# Patient Record
Sex: FEMALE | Race: WHITE | Employment: OTHER | URBAN - METROPOLITAN AREA
[De-identification: names, ages, dates, MRNs, and addresses within clinical notes are randomized per-mention and may not be internally consistent; named-entity substitution may affect disease eponyms.]

---

## 2017-03-08 ENCOUNTER — HOSPITAL ENCOUNTER (OUTPATIENT)
Dept: INFUSION THERAPY | Age: 77
Discharge: HOME | End: 2017-03-08
Payer: MEDICARE

## 2017-03-08 VITALS
RESPIRATION RATE: 16 BRPM | HEART RATE: 71 BPM | BODY MASS INDEX: 27.26 KG/M2 | DIASTOLIC BLOOD PRESSURE: 73 MMHG | OXYGEN SATURATION: 97 % | SYSTOLIC BLOOD PRESSURE: 126 MMHG | TEMPERATURE: 97.6 F | WEIGHT: 163.8 LBS

## 2017-03-08 PROCEDURE — 74011250636 HC RX REV CODE- 250/636

## 2017-03-08 PROCEDURE — 96372 THER/PROPH/DIAG INJ SC/IM: CPT

## 2017-03-08 RX ADMIN — DENOSUMAB 60 MG: 60 INJECTION SUBCUTANEOUS at 15:09

## 2017-03-08 NOTE — PROGRESS NOTES
Arrived to the [facility]. Prolia injection completed. Patient tolerated well. Any issues or concerns during appointment: None. Patient aware that her doctor's office will need to fax us a new Prolia order for her September injection. Next appointment TBD. Discharged ambulatory from Infusion.

## 2017-09-27 ENCOUNTER — HOSPITAL ENCOUNTER (OUTPATIENT)
Dept: INFUSION THERAPY | Age: 77
Discharge: HOME | End: 2017-09-27
Payer: MEDICARE

## 2017-09-27 VITALS
DIASTOLIC BLOOD PRESSURE: 59 MMHG | WEIGHT: 167.2 LBS | RESPIRATION RATE: 16 BRPM | HEART RATE: 69 BPM | TEMPERATURE: 98.1 F | OXYGEN SATURATION: 98 % | BODY MASS INDEX: 27.82 KG/M2 | SYSTOLIC BLOOD PRESSURE: 120 MMHG

## 2017-09-27 LAB
CALCIUM SERPL-MCNC: 9 MG/DL (ref 8.3–10.4)
CREAT SERPL-MCNC: 0.9 MG/DL (ref 0.6–1)

## 2017-09-27 PROCEDURE — 96372 THER/PROPH/DIAG INJ SC/IM: CPT

## 2017-09-27 PROCEDURE — 82565 ASSAY OF CREATININE: CPT | Performed by: INTERNAL MEDICINE

## 2017-09-27 PROCEDURE — 74011250636 HC RX REV CODE- 250/636

## 2017-09-27 PROCEDURE — 82310 ASSAY OF CALCIUM: CPT | Performed by: INTERNAL MEDICINE

## 2017-09-27 PROCEDURE — 36415 COLL VENOUS BLD VENIPUNCTURE: CPT | Performed by: INTERNAL MEDICINE

## 2017-09-27 RX ADMIN — DENOSUMAB 60 MG: 60 INJECTION SUBCUTANEOUS at 14:30

## 2017-09-27 NOTE — PROGRESS NOTES
Arrived to the [facility]. Prolia given. Patient tolerated well  Any issues or concerns during appointment: No  No futire appointments in [facility]PI @ this time  Discharged home ambulatory

## 2018-04-06 ENCOUNTER — HOSPITAL ENCOUNTER (OUTPATIENT)
Dept: INFUSION THERAPY | Age: 78
Discharge: HOME | End: 2018-04-06
Payer: MEDICARE

## 2018-04-06 VITALS
HEART RATE: 70 BPM | BODY MASS INDEX: 28.09 KG/M2 | OXYGEN SATURATION: 97 % | TEMPERATURE: 97.4 F | SYSTOLIC BLOOD PRESSURE: 142 MMHG | WEIGHT: 168.8 LBS | RESPIRATION RATE: 16 BRPM | DIASTOLIC BLOOD PRESSURE: 73 MMHG

## 2018-04-06 LAB
CALCIUM SERPL-MCNC: 9.2 MG/DL (ref 8.3–10.4)
CREAT SERPL-MCNC: 1 MG/DL (ref 0.6–1)

## 2018-04-06 PROCEDURE — 36415 COLL VENOUS BLD VENIPUNCTURE: CPT

## 2018-04-06 PROCEDURE — 74011250636 HC RX REV CODE- 250/636

## 2018-04-06 PROCEDURE — 82565 ASSAY OF CREATININE: CPT

## 2018-04-06 PROCEDURE — 96372 THER/PROPH/DIAG INJ SC/IM: CPT

## 2018-04-06 PROCEDURE — 82310 ASSAY OF CALCIUM: CPT

## 2018-04-06 RX ADMIN — DENOSUMAB 60 MG: 60 INJECTION SUBCUTANEOUS at 14:56

## 2018-04-06 NOTE — PROGRESS NOTES
Problem: Patient Education:  Go to Education Activity  Goal: Patient/Family Education  Outcome: Progressing Towards Goal  Verbalizes/demonstrates understanding of purpose/procedure/potential side effects of prolia.

## 2018-04-06 NOTE — PROGRESS NOTES
Pt arrived ambulatory today at 1405, to receive Prolia. Pt tolerated without difficulty. Patient discharged via ambulatory accompanied by spouse. Instructed to notify physician of any problems, questions or concerns. Allowed opportunity for patient/family to ask questions. Verbalized understanding. Next appointment is August 1 at 200 with Dr. Michelle Mitchell.

## 2018-10-11 ENCOUNTER — APPOINTMENT (OUTPATIENT)
Dept: INFUSION THERAPY | Age: 78
End: 2018-10-11
Payer: MEDICARE

## 2018-10-30 ENCOUNTER — HOSPITAL ENCOUNTER (OUTPATIENT)
Dept: INFUSION THERAPY | Age: 78
Discharge: HOME | End: 2018-10-30
Payer: MEDICARE

## 2018-10-30 VITALS
BODY MASS INDEX: 27.72 KG/M2 | WEIGHT: 166.6 LBS | SYSTOLIC BLOOD PRESSURE: 149 MMHG | RESPIRATION RATE: 16 BRPM | TEMPERATURE: 97.6 F | OXYGEN SATURATION: 97 % | HEART RATE: 71 BPM | DIASTOLIC BLOOD PRESSURE: 88 MMHG

## 2018-10-30 LAB
CALCIUM SERPL-MCNC: 9.2 MG/DL (ref 8.3–10.4)
CREAT SERPL-MCNC: 1.05 MG/DL (ref 0.6–1)

## 2018-10-30 PROCEDURE — 82565 ASSAY OF CREATININE: CPT

## 2018-10-30 PROCEDURE — 82310 ASSAY OF CALCIUM: CPT

## 2018-10-30 PROCEDURE — 36415 COLL VENOUS BLD VENIPUNCTURE: CPT

## 2018-10-30 PROCEDURE — 74011250636 HC RX REV CODE- 250/636

## 2018-10-30 PROCEDURE — 96372 THER/PROPH/DIAG INJ SC/IM: CPT

## 2018-10-30 RX ADMIN — DENOSUMAB 60 MG: 60 INJECTION SUBCUTANEOUS at 16:25

## 2018-10-30 NOTE — PROGRESS NOTES
Tolerated Prolia injection without difficulty. Directed to supplement with Calcium and Vitamin D as directed by MD.  Directed to notify MD for new onset jaw pain and directed to notify dentist prior to any dental procedures. Verbalizes understanding. Patient discharged via ambualtion accompanied by . Instructed to notify physician of any problems, questions or concerns after discharge. Next appointment is 04/30/19 at 1500 with Infusion with labs prior for Prolia.

## 2018-10-30 NOTE — PROGRESS NOTES
Problem: Patient Education:  Go to Education Activity Goal: Patient/Family Education Outcome: Progressing Towards Goal 
Reviewed Prolia injection with patient. Verbalizes understanding.

## 2019-04-30 ENCOUNTER — HOSPITAL ENCOUNTER (OUTPATIENT)
Dept: INFUSION THERAPY | Age: 79
Discharge: HOME | End: 2019-04-30
Payer: MEDICARE

## 2019-04-30 VITALS
TEMPERATURE: 97.8 F | DIASTOLIC BLOOD PRESSURE: 72 MMHG | SYSTOLIC BLOOD PRESSURE: 137 MMHG | RESPIRATION RATE: 18 BRPM | HEART RATE: 66 BPM

## 2019-04-30 LAB — CALCIUM SERPL-MCNC: 9.5 MG/DL (ref 8.3–10.4)

## 2019-04-30 PROCEDURE — 36415 COLL VENOUS BLD VENIPUNCTURE: CPT

## 2019-04-30 PROCEDURE — 74011250636 HC RX REV CODE- 250/636: Performed by: INTERNAL MEDICINE

## 2019-04-30 PROCEDURE — 96372 THER/PROPH/DIAG INJ SC/IM: CPT

## 2019-04-30 PROCEDURE — 82310 ASSAY OF CALCIUM: CPT

## 2019-04-30 RX ADMIN — DENOSUMAB 60 MG: 60 INJECTION SUBCUTANEOUS at 15:11

## 2019-04-30 NOTE — PROGRESS NOTES
Arrived to infusion. Prolia given and tolerated well. Denies concerns. Patient aware next injection is due in 6 months and will schedule. Discharged ambulatory.

## 2019-05-23 ENCOUNTER — HOSPITAL ENCOUNTER (OUTPATIENT)
Dept: MAMMOGRAPHY | Age: 79
Discharge: HOME | End: 2019-05-23
Attending: INTERNAL MEDICINE
Payer: MEDICARE

## 2019-05-23 DIAGNOSIS — M85.80 OSTEOPENIA, UNSPECIFIED LOCATION: ICD-10-CM

## 2019-05-23 DIAGNOSIS — N95.1 POST MENOPAUSAL SYNDROME: ICD-10-CM

## 2019-05-23 DIAGNOSIS — Z78.0 POSTMENOPAUSAL: ICD-10-CM

## 2019-05-23 PROCEDURE — 77080 DXA BONE DENSITY AXIAL: CPT

## 2019-05-24 NOTE — PROGRESS NOTES
DEXA bone density scan report reviewed and shows good improvement in bone density on Prolia.  Please inform the patient

## 2019-11-04 ENCOUNTER — HOSPITAL ENCOUNTER (OUTPATIENT)
Dept: INFUSION THERAPY | Age: 79
Discharge: HOME | End: 2019-11-04
Payer: MEDICARE

## 2019-11-04 VITALS
SYSTOLIC BLOOD PRESSURE: 141 MMHG | DIASTOLIC BLOOD PRESSURE: 79 MMHG | RESPIRATION RATE: 16 BRPM | OXYGEN SATURATION: 98 % | HEART RATE: 72 BPM | TEMPERATURE: 98.1 F

## 2019-11-04 LAB — CALCIUM SERPL-MCNC: 9.5 MG/DL (ref 8.3–10.4)

## 2019-11-04 PROCEDURE — 82310 ASSAY OF CALCIUM: CPT

## 2019-11-04 PROCEDURE — 96372 THER/PROPH/DIAG INJ SC/IM: CPT

## 2019-11-04 PROCEDURE — 36415 COLL VENOUS BLD VENIPUNCTURE: CPT

## 2019-11-04 PROCEDURE — 74011250636 HC RX REV CODE- 250/636: Performed by: INTERNAL MEDICINE

## 2019-11-04 RX ADMIN — DENOSUMAB 60 MG: 60 INJECTION SUBCUTANEOUS at 17:13

## 2019-11-04 NOTE — PROGRESS NOTES
Pt. Arrived to [facility] for:labs which were drawn peripherally and prolia which she tolerated well  Any issues or concerns during this appointment:  Patient aware of next appointment on: follow-up with PCP in 6 months  Pt.  Discharged:ambulatory home

## 2020-05-19 ENCOUNTER — HOSPITAL ENCOUNTER (OUTPATIENT)
Dept: INFUSION THERAPY | Age: 80
Discharge: HOME | End: 2020-05-19
Payer: MEDICARE

## 2020-05-19 VITALS
BODY MASS INDEX: 26.86 KG/M2 | HEART RATE: 76 BPM | RESPIRATION RATE: 16 BRPM | WEIGHT: 155 LBS | TEMPERATURE: 97.6 F | OXYGEN SATURATION: 98 % | SYSTOLIC BLOOD PRESSURE: 136 MMHG | DIASTOLIC BLOOD PRESSURE: 79 MMHG

## 2020-05-19 LAB
ALBUMIN SERPL-MCNC: 4 G/DL (ref 3.2–4.6)
CALCIUM SERPL-MCNC: 9.4 MG/DL (ref 8.3–10.4)
CREAT SERPL-MCNC: 1.2 MG/DL (ref 0.6–1)

## 2020-05-19 PROCEDURE — 82565 ASSAY OF CREATININE: CPT

## 2020-05-19 PROCEDURE — 36415 COLL VENOUS BLD VENIPUNCTURE: CPT

## 2020-05-19 PROCEDURE — 96372 THER/PROPH/DIAG INJ SC/IM: CPT

## 2020-05-19 PROCEDURE — 82040 ASSAY OF SERUM ALBUMIN: CPT

## 2020-05-19 PROCEDURE — 82310 ASSAY OF CALCIUM: CPT

## 2020-05-19 PROCEDURE — 74011250636 HC RX REV CODE- 250/636: Performed by: INTERNAL MEDICINE

## 2020-05-19 RX ADMIN — DENOSUMAB 60 MG: 60 INJECTION SUBCUTANEOUS at 14:03

## 2020-05-19 NOTE — PROGRESS NOTES
Arrived to the [facility]. Prolia injection completed. Patient tolerated well. Any issues or concerns during appointment: no.  Patient has no future infusion appointments at this time. Discharged ambulatory with self.

## 2020-12-09 ENCOUNTER — HOSPITAL ENCOUNTER (OUTPATIENT)
Dept: INFUSION THERAPY | Age: 80
Discharge: HOME | End: 2020-12-09
Payer: MEDICARE

## 2020-12-09 ENCOUNTER — HOSPITAL ENCOUNTER (OUTPATIENT)
Dept: LAB | Age: 80
Discharge: HOME | End: 2020-12-09

## 2020-12-09 VITALS
OXYGEN SATURATION: 99 % | HEART RATE: 75 BPM | SYSTOLIC BLOOD PRESSURE: 119 MMHG | RESPIRATION RATE: 16 BRPM | TEMPERATURE: 96.9 F | DIASTOLIC BLOOD PRESSURE: 73 MMHG

## 2020-12-09 LAB — CALCIUM SERPL-MCNC: 10.1 MG/DL (ref 8.3–10.4)

## 2020-12-09 PROCEDURE — 82310 ASSAY OF CALCIUM: CPT

## 2020-12-09 PROCEDURE — 96372 THER/PROPH/DIAG INJ SC/IM: CPT

## 2020-12-09 PROCEDURE — 74011250636 HC RX REV CODE- 250/636: Performed by: INTERNAL MEDICINE

## 2020-12-09 PROCEDURE — 36415 COLL VENOUS BLD VENIPUNCTURE: CPT

## 2020-12-09 RX ADMIN — DENOSUMAB 60 MG: 60 INJECTION SUBCUTANEOUS at 15:33

## 2020-12-09 NOTE — PROGRESS NOTES
Arrived to the [facility]. Prolia injection completed. Patient tolerated wel. Any issues or concerns during appointment: none. Discharged ambulatory.

## 2021-06-22 ENCOUNTER — HOSPITAL ENCOUNTER (OUTPATIENT)
Dept: LAB | Age: 81
Discharge: HOME | End: 2021-06-22

## 2021-06-22 ENCOUNTER — HOSPITAL ENCOUNTER (OUTPATIENT)
Dept: INFUSION THERAPY | Age: 81
Discharge: HOME | End: 2021-06-22
Payer: MEDICARE

## 2021-06-22 VITALS
TEMPERATURE: 98.1 F | SYSTOLIC BLOOD PRESSURE: 116 MMHG | DIASTOLIC BLOOD PRESSURE: 52 MMHG | HEART RATE: 70 BPM | RESPIRATION RATE: 16 BRPM | OXYGEN SATURATION: 97 %

## 2021-06-22 LAB — CALCIUM SERPL-MCNC: 9.3 MG/DL (ref 8.3–10.4)

## 2021-06-22 PROCEDURE — 74011250636 HC RX REV CODE- 250/636: Performed by: INTERNAL MEDICINE

## 2021-06-22 PROCEDURE — 82310 ASSAY OF CALCIUM: CPT

## 2021-06-22 PROCEDURE — 96372 THER/PROPH/DIAG INJ SC/IM: CPT

## 2021-06-22 PROCEDURE — 36415 COLL VENOUS BLD VENIPUNCTURE: CPT

## 2021-06-22 RX ADMIN — DENOSUMAB 60 MG: 60 INJECTION SUBCUTANEOUS at 16:28

## 2021-06-22 NOTE — PROGRESS NOTES
Arrived to the [facility]. Prolia completed and tolerated well. Any issues or concerns during appointment: denies  Patient given education on prolia injection   Instructed patient to notify provider for any issues or worrisome symptoms. They verbalized understanding. Patient aware of next infusion appointment scheduled for - needs new order prior to scheduling  Discharged ambulatory with self.

## 2021-06-24 ENCOUNTER — APPOINTMENT (OUTPATIENT)
Dept: INFUSION THERAPY | Age: 81
End: 2021-06-24

## 2021-08-27 ENCOUNTER — HOSPITAL ENCOUNTER (OUTPATIENT)
Dept: MAMMOGRAPHY | Age: 81
Discharge: HOME | End: 2021-08-27
Attending: INTERNAL MEDICINE
Payer: MEDICARE

## 2021-08-27 DIAGNOSIS — M85.80 OSTEOPENIA, UNSPECIFIED LOCATION: ICD-10-CM

## 2021-08-27 DIAGNOSIS — N95.1 POST MENOPAUSAL SYNDROME: ICD-10-CM

## 2021-08-27 DIAGNOSIS — Z78.0 POSTMENOPAUSAL: ICD-10-CM

## 2021-08-27 PROCEDURE — 77080 DXA BONE DENSITY AXIAL: CPT

## 2021-08-30 NOTE — PROGRESS NOTES
DEXA Bone density scan shows very weak bones or osteoporosis and they are getting worse. Has she been getting Prolia? If so may need to try Reclast once a year IV infusion.  Please inform the patient

## 2021-08-30 NOTE — PROGRESS NOTES
I called and notified the pt of these results/recommendations. The pt verbalized understanding and stated she has been getting the Prolia Infusions. She is willing to get the Reclast Infusion. I will forward to Dr. Debra Rodas for ordering.

## 2021-09-20 ENCOUNTER — HOSPITAL ENCOUNTER (OUTPATIENT)
Dept: LAB | Age: 81
Discharge: HOME | End: 2021-09-20

## 2021-09-20 ENCOUNTER — HOSPITAL ENCOUNTER (OUTPATIENT)
Dept: INFUSION THERAPY | Age: 81
Discharge: HOME | End: 2021-09-20
Payer: MEDICARE

## 2021-09-20 VITALS
HEART RATE: 57 BPM | HEIGHT: 63 IN | RESPIRATION RATE: 16 BRPM | WEIGHT: 155 LBS | TEMPERATURE: 98.1 F | DIASTOLIC BLOOD PRESSURE: 69 MMHG | BODY MASS INDEX: 27.46 KG/M2 | OXYGEN SATURATION: 97 % | SYSTOLIC BLOOD PRESSURE: 131 MMHG

## 2021-09-20 DIAGNOSIS — M85.80 OSTEOPENIA, UNSPECIFIED LOCATION: Primary | ICD-10-CM

## 2021-09-20 LAB
ALBUMIN SERPL-MCNC: 3.9 G/DL (ref 3.2–4.6)
CALCIUM SERPL-MCNC: 8.9 MG/DL (ref 8.3–10.4)
CREAT SERPL-MCNC: 1 MG/DL (ref 0.6–1)

## 2021-09-20 PROCEDURE — 96374 THER/PROPH/DIAG INJ IV PUSH: CPT

## 2021-09-20 PROCEDURE — 82040 ASSAY OF SERUM ALBUMIN: CPT

## 2021-09-20 PROCEDURE — 82310 ASSAY OF CALCIUM: CPT

## 2021-09-20 PROCEDURE — 36415 COLL VENOUS BLD VENIPUNCTURE: CPT

## 2021-09-20 PROCEDURE — 74011250636 HC RX REV CODE- 250/636: Performed by: INTERNAL MEDICINE

## 2021-09-20 PROCEDURE — 82565 ASSAY OF CREATININE: CPT

## 2021-09-20 RX ORDER — SODIUM CHLORIDE 9 MG/ML
25 INJECTION, SOLUTION INTRAVENOUS CONTINUOUS
OUTPATIENT
Start: 2022-09-19

## 2021-09-20 RX ORDER — SODIUM CHLORIDE 9 MG/ML
25 INJECTION, SOLUTION INTRAVENOUS CONTINUOUS
Status: DISCONTINUED | OUTPATIENT
Start: 2021-09-20 | End: 2021-09-21 | Stop reason: HOSPADM

## 2021-09-20 RX ORDER — ONDANSETRON 2 MG/ML
8 INJECTION INTRAMUSCULAR; INTRAVENOUS AS NEEDED
OUTPATIENT
Start: 2022-09-19

## 2021-09-20 RX ORDER — ZOLEDRONIC ACID 5 MG/100ML
5 INJECTION, SOLUTION INTRAVENOUS ONCE
Status: COMPLETED | OUTPATIENT
Start: 2021-09-20 | End: 2021-09-20

## 2021-09-20 RX ORDER — DIPHENHYDRAMINE HYDROCHLORIDE 50 MG/ML
25 INJECTION, SOLUTION INTRAMUSCULAR; INTRAVENOUS AS NEEDED
Start: 2022-09-19

## 2021-09-20 RX ORDER — HEPARIN 100 UNIT/ML
300-500 SYRINGE INTRAVENOUS AS NEEDED
Start: 2022-09-19

## 2021-09-20 RX ORDER — DIPHENHYDRAMINE HYDROCHLORIDE 50 MG/ML
50 INJECTION, SOLUTION INTRAMUSCULAR; INTRAVENOUS AS NEEDED
Start: 2022-09-19

## 2021-09-20 RX ORDER — ALBUTEROL SULFATE 0.83 MG/ML
2.5 SOLUTION RESPIRATORY (INHALATION) AS NEEDED
Start: 2022-09-19

## 2021-09-20 RX ORDER — SODIUM CHLORIDE 0.9 % (FLUSH) 0.9 %
10 SYRINGE (ML) INJECTION AS NEEDED
Status: DISCONTINUED | OUTPATIENT
Start: 2021-09-20 | End: 2021-09-22 | Stop reason: HOSPADM

## 2021-09-20 RX ORDER — EPINEPHRINE 1 MG/ML
0.3 INJECTION, SOLUTION, CONCENTRATE INTRAVENOUS AS NEEDED
OUTPATIENT
Start: 2022-09-19

## 2021-09-20 RX ORDER — SODIUM CHLORIDE 9 MG/ML
25 INJECTION, SOLUTION INTRAVENOUS ONCE
Status: COMPLETED | OUTPATIENT
Start: 2021-09-20 | End: 2021-09-20

## 2021-09-20 RX ORDER — HYDROCORTISONE SODIUM SUCCINATE 100 MG/2ML
100 INJECTION, POWDER, FOR SOLUTION INTRAMUSCULAR; INTRAVENOUS AS NEEDED
OUTPATIENT
Start: 2022-09-19

## 2021-09-20 RX ORDER — ZOLEDRONIC ACID 5 MG/100ML
5 INJECTION, SOLUTION INTRAVENOUS ONCE
OUTPATIENT
Start: 2022-09-19 | End: 2022-09-19

## 2021-09-20 RX ORDER — SODIUM CHLORIDE 0.9 % (FLUSH) 0.9 %
10 SYRINGE (ML) INJECTION AS NEEDED
OUTPATIENT
Start: 2022-09-19

## 2021-09-20 RX ORDER — SODIUM CHLORIDE 9 MG/ML
10 INJECTION INTRAMUSCULAR; INTRAVENOUS; SUBCUTANEOUS AS NEEDED
OUTPATIENT
Start: 2022-09-19

## 2021-09-20 RX ORDER — ACETAMINOPHEN 325 MG/1
650 TABLET ORAL AS NEEDED
Start: 2022-09-19

## 2021-09-20 RX ADMIN — ZOLEDRONIC ACID 5 MG: 0.05 INJECTION, SOLUTION INTRAVENOUS at 15:15

## 2021-09-20 RX ADMIN — SODIUM CHLORIDE 25 ML/HR: 900 INJECTION, SOLUTION INTRAVENOUS at 14:35

## 2021-09-20 RX ADMIN — Medication 10 ML: at 14:35

## 2021-09-20 NOTE — PROGRESS NOTES
Arrived to the [facility] at the [facility]. Reclast infusion completed. Patient tolerated well. Any issues or concerns during appointment: NO.  Patient aware of next infusion appointment on 09/20/22 (date) at [phone] (time). Discharged ambulatory.

## 2022-06-28 ENCOUNTER — TELEPHONE (OUTPATIENT)
Dept: INTERNAL MEDICINE CLINIC | Facility: CLINIC | Age: 82
End: 2022-06-28

## 2022-06-28 ENCOUNTER — OFFICE VISIT (OUTPATIENT)
Dept: INTERNAL MEDICINE CLINIC | Facility: CLINIC | Age: 82
End: 2022-06-28
Payer: MEDICARE

## 2022-06-28 VITALS
HEART RATE: 62 BPM | BODY MASS INDEX: 26.82 KG/M2 | OXYGEN SATURATION: 97 % | WEIGHT: 151.4 LBS | TEMPERATURE: 97.5 F | SYSTOLIC BLOOD PRESSURE: 131 MMHG | HEIGHT: 63 IN | DIASTOLIC BLOOD PRESSURE: 69 MMHG

## 2022-06-28 DIAGNOSIS — E55.9 VITAMIN D DEFICIENCY: ICD-10-CM

## 2022-06-28 DIAGNOSIS — N95.1 MENOPAUSAL SYMPTOMS: ICD-10-CM

## 2022-06-28 DIAGNOSIS — E03.9 ACQUIRED HYPOTHYROIDISM: ICD-10-CM

## 2022-06-28 DIAGNOSIS — E78.49 OTHER HYPERLIPIDEMIA: ICD-10-CM

## 2022-06-28 DIAGNOSIS — M85.80 OSTEOPENIA, UNSPECIFIED LOCATION: ICD-10-CM

## 2022-06-28 DIAGNOSIS — E03.9 ACQUIRED HYPOTHYROIDISM: Primary | ICD-10-CM

## 2022-06-28 PROCEDURE — G8399 PT W/DXA RESULTS DOCUMENT: HCPCS | Performed by: INTERNAL MEDICINE

## 2022-06-28 PROCEDURE — G8427 DOCREV CUR MEDS BY ELIG CLIN: HCPCS | Performed by: INTERNAL MEDICINE

## 2022-06-28 PROCEDURE — G8417 CALC BMI ABV UP PARAM F/U: HCPCS | Performed by: INTERNAL MEDICINE

## 2022-06-28 PROCEDURE — 1123F ACP DISCUSS/DSCN MKR DOCD: CPT | Performed by: INTERNAL MEDICINE

## 2022-06-28 PROCEDURE — 99214 OFFICE O/P EST MOD 30 MIN: CPT | Performed by: INTERNAL MEDICINE

## 2022-06-28 PROCEDURE — 1090F PRES/ABSN URINE INCON ASSESS: CPT | Performed by: INTERNAL MEDICINE

## 2022-06-28 PROCEDURE — 1036F TOBACCO NON-USER: CPT | Performed by: INTERNAL MEDICINE

## 2022-06-28 RX ORDER — BEMPEDOIC ACID 180 MG/1
180 TABLET, FILM COATED ORAL DAILY
Qty: 90 TABLET | Refills: 1 | Status: SHIPPED | OUTPATIENT
Start: 2022-06-28

## 2022-06-28 RX ORDER — ZOLEDRONIC ACID 5 MG/100ML
5 INJECTION, SOLUTION INTRAVENOUS ONCE
COMMUNITY

## 2022-06-28 RX ORDER — LEVOTHYROXINE SODIUM 0.07 MG/1
75 TABLET ORAL
Qty: 90 TABLET | Refills: 1 | Status: SHIPPED | OUTPATIENT
Start: 2022-06-28 | End: 2022-10-05

## 2022-06-28 RX ORDER — ERGOCALCIFEROL (VITAMIN D2) 1250 MCG
50000 CAPSULE ORAL WEEKLY
Qty: 12 CAPSULE | Refills: 1 | Status: SHIPPED | OUTPATIENT
Start: 2022-06-28

## 2022-06-28 RX ORDER — MOMETASONE FUROATE 1 MG/G
CREAM TOPICAL 2 TIMES DAILY
Status: CANCELLED | OUTPATIENT
Start: 2022-06-28

## 2022-06-28 ASSESSMENT — ANXIETY QUESTIONNAIRES
6. BECOMING EASILY ANNOYED OR IRRITABLE: 0
1. FEELING NERVOUS, ANXIOUS, OR ON EDGE: 0
4. TROUBLE RELAXING: 0
5. BEING SO RESTLESS THAT IT IS HARD TO SIT STILL: 0
2. NOT BEING ABLE TO STOP OR CONTROL WORRYING: 0
3. WORRYING TOO MUCH ABOUT DIFFERENT THINGS: 0
7. FEELING AFRAID AS IF SOMETHING AWFUL MIGHT HAPPEN: 0
GAD7 TOTAL SCORE: 0

## 2022-06-28 ASSESSMENT — PATIENT HEALTH QUESTIONNAIRE - PHQ9
SUM OF ALL RESPONSES TO PHQ QUESTIONS 1-9: 0
SUM OF ALL RESPONSES TO PHQ9 QUESTIONS 1 & 2: 0
2. FEELING DOWN, DEPRESSED OR HOPELESS: 0
SUM OF ALL RESPONSES TO PHQ QUESTIONS 1-9: 0
SUM OF ALL RESPONSES TO PHQ QUESTIONS 1-9: 0
1. LITTLE INTEREST OR PLEASURE IN DOING THINGS: 0
SUM OF ALL RESPONSES TO PHQ QUESTIONS 1-9: 0

## 2022-06-28 ASSESSMENT — ENCOUNTER SYMPTOMS: SHORTNESS OF BREATH: 0

## 2022-06-28 NOTE — TELEPHONE ENCOUNTER
We received a PA request for the pts Nexletol through the in-basket. The questions were answered and the following notation was made: The pt is unable to tolerate statins. She has tried Crestor, Lipitor, Simvastatin, Pravastatin, Pitovastatin with significant intolerance. This is the only medication she is able to take without side effects. We are awaiting a response.

## 2022-06-28 NOTE — PROGRESS NOTES
Lizeth Solorio M.D. Internal Medicine  [address] [place] [address]  Office : [phone]  Fax : [phone]    Chief Complaint   Patient presents with    Cholesterol Problem     Pt states she is no longer on Prolia. She states she is on Reclast.  She also has not started the Nexletol. History of Present Illness:  Berhane Bolanos is a 79 y.o. female. HPI    Hyperlipidemia  Patient is in for follow-up for hyperlipidemia. Diet and Lifestyle: generally follows a low fat low cholesterol diet. Risk factors for vascular disease consist of hyperlipidemia. Last LDL was   Lab Results   Component Value Date    LDLCALC 173 (H) 10/21/2021   . Last ALT was   Lab Results   Component Value Date    ALT 15 10/21/2021   . No muscle aches. Never tried Nexletol      Hypothyroidism  She presents for follow up of  hypothyroidism and reports  no new problems. . She reports her symptoms are  stable. The patient currently is taking thyroid replacement. Lab Results   Component Value Date    TSH 3.560 10/21/2021   . Osteopenia/Vitamin D Deficiency  Tolerating Reclast.  DEXA due again in August 2023    Past Medical History:  Past Medical History:   Diagnosis Date    Gastroesophageal reflux disease     Hyperlipidemia     Hypothyroidism     Osteopenia     Vitamin D deficiency      Past Surgical History:  Past Surgical History:   Procedure Laterality Date    CATARACT REMOVAL      lens implants on 8/30 in the left eye    COLONOSCOPY  2006    HEMORRHOID SURGERY      HYSTERECTOMY (CERVIX STATUS UNKNOWN)      OTHER SURGICAL HISTORY      lap jabari fundoplication    OVARY REMOVAL       Allergies:    Allergies   Allergen Reactions    Bee Venom Swelling    Codeine Other (See Comments)     N/V    Sulfa Antibiotics Nausea Only    Penicillins Rash     Medications:   Current Outpatient Medications   Medication Sig Dispense Refill    Bempedoic Acid (NEXLETOL) 180 MG TABS Take 180 mg by mouth daily 90 tablet 1    ergocalciferol (ERGOCALCIFEROL) 1.25 MG (18213 UT) capsule Take 1 capsule by mouth once a week TAKE 1 CAPSULE BY MOUTH ONE TIME PER WEEK 12 capsule 1    estradiol (CLIMARA) 0.025 MG/24HR Place 1 patch onto the skin once a week 12 patch 1    levothyroxine (SYNTHROID) 75 MCG tablet Take 1 tablet by mouth every morning (before breakfast) TAKE 1 TABLET BY MOUTH EVERY DAY BEFORE BREAKFAST 90 tablet 1    zoledronic acid (RECLAST) 5 MG/100ML SOLN Infuse 5 mg intravenously once      mometasone (ELOCON) 0.1 % cream Apply topically 2 times daily      denosumab (PROLIA) 60 MG/ML SOSY SC injection Inject 60 mg into the skin (Patient not taking: Reported on 6/28/2022)       No current facility-administered medications for this visit. Social History:  Social History     Tobacco Use    Smoking status: Never Smoker    Smokeless tobacco: Never Used   Substance Use Topics    Alcohol use: No     Family History  Family History   Problem Relation Age of Onset    Lung Disease Brother     Heart Attack Brother     Heart Attack Sister     Hypertension Sister     Diabetes Sister     Thyroid Cancer Sister     Pacemaker Sister     Diabetes Brother     Thyroid Disease Sister         Takes on Levothyroxine    Diabetes Mother     Hypertension Mother    [facility] Stroke Father     Hypertension Father     Cancer Sister         Ovarian    Diabetes Brother        Review of Systems   Constitutional: Negative for chills, fatigue and fever. Respiratory: Negative for shortness of breath. Cardiovascular: Negative for chest pain and leg swelling. Skin: Negative for rash. Psychiatric/Behavioral: Negative for confusion and sleep disturbance.          Vital Signs  /69 (Site: Left Upper Arm, Position: Sitting, Cuff Size: Medium Adult)   Pulse 62   Temp 97.5 °F (36.4 °C) (Temporal)   Ht 5' 3\" (1.6 m)   Wt 151 lb 6.4 oz (68.7 kg)   SpO2 97%   BMI 26.82 kg/m² Body mass index is 26.82 kg/m². Physical Exam  Vitals reviewed. Constitutional:       General: She is not in acute distress. Appearance: Normal appearance. She is not ill-appearing. HENT:      Head: Normocephalic and atraumatic. Eyes:      General: No scleral icterus. Extraocular Movements: Extraocular movements intact. Conjunctiva/sclera: Conjunctivae normal.   Neck:      Vascular: No carotid bruit. Cardiovascular:      Rate and Rhythm: Normal rate and regular rhythm. Heart sounds: Normal heart sounds. No murmur heard. Pulmonary:      Effort: Pulmonary effort is normal.      Breath sounds: Normal breath sounds. Musculoskeletal:         General: No swelling. Skin:     Coloration: Skin is not jaundiced. Findings: No rash. Neurological:      General: No focal deficit present. Mental Status: She is alert. Mental status is at baseline. Cranial Nerves: No cranial nerve deficit. Motor: No weakness. Gait: Gait normal.   Psychiatric:         Mood and Affect: Mood normal.         Behavior: Behavior normal.         Thought Content: Thought content normal.         Judgment: Judgment normal.           Assessment/Plan:  Herb Earl was seen today for cholesterol problem. Diagnoses and all orders for this visit:    Acquired hypothyroidism  -     levothyroxine (SYNTHROID) 75 MCG tablet; Take 1 tablet by mouth every morning (before breakfast) TAKE 1 TABLET BY MOUTH EVERY DAY BEFORE BREAKFAST  -     TSH; Future    Other hyperlipidemia  -     Bempedoic Acid (NEXLETOL) 180 MG TABS; Take 180 mg by mouth daily    Osteopenia, unspecified location    Vitamin D deficiency  -     ergocalciferol (ERGOCALCIFEROL) 1.25 MG (10586 UT) capsule; Take 1 capsule by mouth once a week TAKE 1 CAPSULE BY MOUTH ONE TIME PER WEEK  -     Vitamin D 25 Hydroxy; Future    Menopausal symptoms  -     estradiol (CLIMARA) 0.025 MG/24HR;  Place 1 patch onto the skin once a week        Return in about 4 months (around 10/28/2022), or if symptoms worsen or fail to improve.   __  Scot Saint, M.D.

## 2022-06-29 LAB
25(OH)D3 SERPL-MCNC: 88.7 NG/ML (ref 30–100)
TSH, 3RD GENERATION: 0.19 UIU/ML (ref 0.36–3.74)

## 2022-06-29 NOTE — TELEPHONE ENCOUNTER
The pts Nexletol was approved by the insurance.  The authorization is good from 1/1/22 through 12/31/22

## 2022-09-01 ENCOUNTER — TELEPHONE (OUTPATIENT)
Dept: INTERNAL MEDICINE CLINIC | Facility: CLINIC | Age: 82
End: 2022-09-01

## 2022-09-01 NOTE — TELEPHONE ENCOUNTER
----- Message from Josesito Chase sent at 9/1/2022  8:48 AM EDT -----  Subject: Message to Provider    QUESTIONS  Information for Provider? Pts daughter Tatiana Gonzalez would like a call   back to discuss the pts possible Dementia. She would like to talk to Bobby Marte. [phone]  ---------------------------------------------------------------------------  --------------  Ulysses CARDOZA  [phone]; OK to leave message on voicemail  ---------------------------------------------------------------------------  --------------  SCRIPT ANSWERS  Relationship to Patient? Other  Representative Name? Tricia  Is the Representative on the appropriate HIPAA document in Epic?  Yes

## 2022-09-12 ENCOUNTER — TELEPHONE (OUTPATIENT)
Dept: INTERNAL MEDICINE CLINIC | Facility: CLINIC | Age: 82
End: 2022-09-12

## 2022-09-12 NOTE — TELEPHONE ENCOUNTER
----- Message from Cat sent at 9/9/2022 12:05 PM EDT -----  Subject: Message to Provider    QUESTIONS  Information for Provider? pt's daughter (on hipaa release doc) asked to   have call made from office to pt letting her know that her appt 9/27 had   been r/s to 10/4. if pt does not answer please call [phone].   ---------------------------------------------------------------------------  --------------  Elsy Patton INFO  [phone]; Do not leave any message, patient will call back for answer  ---------------------------------------------------------------------------  --------------  SCRIPT ANSWERS  Relationship to Patient? Other  Representative Name? alli   Is the Representative on the appropriate HIPAA document in Epic?  Yes

## 2022-09-19 ENCOUNTER — TELEPHONE (OUTPATIENT)
Dept: INTERNAL MEDICINE CLINIC | Facility: CLINIC | Age: 82
End: 2022-09-19

## 2022-09-19 RX ORDER — ZOLEDRONIC ACID 5 MG/100ML
5 INJECTION, SOLUTION INTRAVENOUS ONCE
Status: CANCELLED | OUTPATIENT
Start: 2022-09-20 | End: 2022-09-20

## 2022-09-19 RX ORDER — SODIUM CHLORIDE 9 MG/ML
5-250 INJECTION, SOLUTION INTRAVENOUS PRN
OUTPATIENT
Start: 2022-09-20

## 2022-09-19 RX ORDER — SODIUM CHLORIDE 0.9 % (FLUSH) 0.9 %
5-40 SYRINGE (ML) INJECTION PRN
Status: CANCELLED | OUTPATIENT
Start: 2022-09-20

## 2022-09-19 RX ORDER — HEPARIN SODIUM (PORCINE) LOCK FLUSH IV SOLN 100 UNIT/ML 100 UNIT/ML
500 SOLUTION INTRAVENOUS PRN
OUTPATIENT
Start: 2022-09-20

## 2022-09-19 RX ORDER — FAMOTIDINE 10 MG/ML
20 INJECTION, SOLUTION INTRAVENOUS
OUTPATIENT
Start: 2022-09-20

## 2022-09-19 RX ORDER — ONDANSETRON 2 MG/ML
8 INJECTION INTRAMUSCULAR; INTRAVENOUS
OUTPATIENT
Start: 2022-09-20

## 2022-09-19 RX ORDER — 0.9 % SODIUM CHLORIDE 0.9 %
250 INTRAVENOUS SOLUTION INTRAVENOUS ONCE
OUTPATIENT
Start: 2022-09-20 | End: 2022-09-20

## 2022-09-19 RX ORDER — ALBUTEROL SULFATE 90 UG/1
4 AEROSOL, METERED RESPIRATORY (INHALATION) PRN
OUTPATIENT
Start: 2022-09-20

## 2022-09-19 RX ORDER — ACETAMINOPHEN 325 MG/1
650 TABLET ORAL
OUTPATIENT
Start: 2022-09-20

## 2022-09-19 RX ORDER — DIPHENHYDRAMINE HYDROCHLORIDE 50 MG/ML
50 INJECTION INTRAMUSCULAR; INTRAVENOUS
OUTPATIENT
Start: 2022-09-20

## 2022-09-19 RX ORDER — SODIUM CHLORIDE 9 MG/ML
INJECTION, SOLUTION INTRAVENOUS ONCE
Status: CANCELLED | OUTPATIENT
Start: 2022-09-20 | End: 2022-09-20

## 2022-09-19 RX ORDER — SODIUM CHLORIDE 9 MG/ML
INJECTION, SOLUTION INTRAVENOUS CONTINUOUS
OUTPATIENT
Start: 2022-09-20

## 2022-09-19 RX ORDER — EPINEPHRINE 1 MG/ML
0.3 INJECTION, SOLUTION, CONCENTRATE INTRAVENOUS PRN
OUTPATIENT
Start: 2022-09-20

## 2022-09-20 ENCOUNTER — HOSPITAL ENCOUNTER (OUTPATIENT)
Dept: LAB | Age: 82
Discharge: HOME | End: 2022-09-23

## 2022-09-20 ENCOUNTER — HOSPITAL ENCOUNTER (OUTPATIENT)
Dept: INFUSION THERAPY | Age: 82
Discharge: HOME | End: 2022-09-20
Payer: MEDICARE

## 2022-09-20 VITALS
BODY MASS INDEX: 25.27 KG/M2 | HEART RATE: 68 BPM | DIASTOLIC BLOOD PRESSURE: 73 MMHG | SYSTOLIC BLOOD PRESSURE: 161 MMHG | HEIGHT: 64 IN | WEIGHT: 148 LBS | RESPIRATION RATE: 16 BRPM | TEMPERATURE: 98.3 F

## 2022-09-20 DIAGNOSIS — M85.80 OSTEOPENIA, UNSPECIFIED LOCATION: Primary | ICD-10-CM

## 2022-09-20 LAB
ALBUMIN SERPL-MCNC: 3.8 G/DL (ref 3.2–4.6)
ALBUMIN/GLOB SERPL: 1.2 {RATIO} (ref 1.2–3.5)
ALP SERPL-CCNC: 49 U/L (ref 50–136)
ALT SERPL-CCNC: 18 U/L (ref 12–65)
ANION GAP SERPL CALC-SCNC: 4 MMOL/L (ref 4–13)
AST SERPL-CCNC: 15 U/L (ref 15–37)
BILIRUB SERPL-MCNC: 0.7 MG/DL (ref 0.2–1.1)
BUN SERPL-MCNC: 15 MG/DL (ref 8–23)
CALCIUM SERPL-MCNC: 9 MG/DL (ref 8.3–10.4)
CHLORIDE SERPL-SCNC: 106 MMOL/L (ref 101–110)
CO2 SERPL-SCNC: 27 MMOL/L (ref 21–32)
CREAT SERPL-MCNC: 1 MG/DL (ref 0.6–1)
GLOBULIN SER CALC-MCNC: 3.3 G/DL (ref 2.3–3.5)
GLUCOSE SERPL-MCNC: 96 MG/DL (ref 65–100)
POTASSIUM SERPL-SCNC: 4.1 MMOL/L (ref 3.5–5.1)
PROT SERPL-MCNC: 7.1 G/DL (ref 6.3–8.2)
SODIUM SERPL-SCNC: 137 MMOL/L (ref 136–145)

## 2022-09-20 PROCEDURE — 36415 COLL VENOUS BLD VENIPUNCTURE: CPT

## 2022-09-20 PROCEDURE — 80053 COMPREHEN METABOLIC PANEL: CPT

## 2022-09-20 PROCEDURE — 96374 THER/PROPH/DIAG INJ IV PUSH: CPT

## 2022-09-20 PROCEDURE — 6360000002 HC RX W HCPCS: Performed by: INTERNAL MEDICINE

## 2022-09-20 PROCEDURE — 2580000003 HC RX 258: Performed by: INTERNAL MEDICINE

## 2022-09-20 RX ORDER — SODIUM CHLORIDE 9 MG/ML
INJECTION, SOLUTION INTRAVENOUS CONTINUOUS
OUTPATIENT
Start: 2022-09-20

## 2022-09-20 RX ORDER — ALBUTEROL SULFATE 90 UG/1
4 AEROSOL, METERED RESPIRATORY (INHALATION) PRN
OUTPATIENT
Start: 2022-09-20

## 2022-09-20 RX ORDER — SODIUM CHLORIDE 9 MG/ML
INJECTION, SOLUTION INTRAVENOUS ONCE
Status: COMPLETED | OUTPATIENT
Start: 2022-09-20 | End: 2022-09-20

## 2022-09-20 RX ORDER — SODIUM CHLORIDE 9 MG/ML
5-250 INJECTION, SOLUTION INTRAVENOUS PRN
OUTPATIENT
Start: 2022-09-20

## 2022-09-20 RX ORDER — ACETAMINOPHEN 325 MG/1
650 TABLET ORAL
OUTPATIENT
Start: 2022-09-20

## 2022-09-20 RX ORDER — SODIUM CHLORIDE 9 MG/ML
INJECTION, SOLUTION INTRAVENOUS ONCE
Status: CANCELLED | OUTPATIENT
Start: 2022-09-20 | End: 2022-09-20

## 2022-09-20 RX ORDER — ZOLEDRONIC ACID 5 MG/100ML
5 INJECTION, SOLUTION INTRAVENOUS ONCE
Status: CANCELLED | OUTPATIENT
Start: 2022-09-20 | End: 2022-09-20

## 2022-09-20 RX ORDER — HEPARIN SODIUM (PORCINE) LOCK FLUSH IV SOLN 100 UNIT/ML 100 UNIT/ML
500 SOLUTION INTRAVENOUS PRN
OUTPATIENT
Start: 2022-09-20

## 2022-09-20 RX ORDER — 0.9 % SODIUM CHLORIDE 0.9 %
250 INTRAVENOUS SOLUTION INTRAVENOUS ONCE
OUTPATIENT
Start: 2022-09-20 | End: 2022-09-20

## 2022-09-20 RX ORDER — ONDANSETRON 2 MG/ML
8 INJECTION INTRAMUSCULAR; INTRAVENOUS
OUTPATIENT
Start: 2022-09-20

## 2022-09-20 RX ORDER — SODIUM CHLORIDE 0.9 % (FLUSH) 0.9 %
5-40 SYRINGE (ML) INJECTION PRN
Status: DISCONTINUED | OUTPATIENT
Start: 2022-09-20 | End: 2022-09-21 | Stop reason: HOSPADM

## 2022-09-20 RX ORDER — EPINEPHRINE 1 MG/ML
0.3 INJECTION, SOLUTION, CONCENTRATE INTRAVENOUS PRN
OUTPATIENT
Start: 2022-09-20

## 2022-09-20 RX ORDER — SODIUM CHLORIDE 0.9 % (FLUSH) 0.9 %
5-40 SYRINGE (ML) INJECTION PRN
OUTPATIENT
Start: 2022-09-20

## 2022-09-20 RX ORDER — DIPHENHYDRAMINE HYDROCHLORIDE 50 MG/ML
50 INJECTION INTRAMUSCULAR; INTRAVENOUS
OUTPATIENT
Start: 2022-09-20

## 2022-09-20 RX ORDER — ZOLEDRONIC ACID 5 MG/100ML
5 INJECTION, SOLUTION INTRAVENOUS ONCE
Status: COMPLETED | OUTPATIENT
Start: 2022-09-20 | End: 2022-09-20

## 2022-09-20 RX ADMIN — SODIUM CHLORIDE: 9 INJECTION, SOLUTION INTRAVENOUS at 16:36

## 2022-09-20 RX ADMIN — SODIUM CHLORIDE, PRESERVATIVE FREE 10 ML: 5 INJECTION INTRAVENOUS at 16:15

## 2022-09-20 RX ADMIN — SODIUM CHLORIDE, PRESERVATIVE FREE 10 ML: 5 INJECTION INTRAVENOUS at 17:17

## 2022-09-20 RX ADMIN — ZOLEDRONIC ACID 5 MG: 0.05 INJECTION, SOLUTION INTRAVENOUS at 16:44

## 2022-09-20 NOTE — PROGRESS NOTES
Arrived to the [facility]. Reclast completed. Patient tolerated well. Any issues or concerns during appointment: none. Patient educated on Reclast.  Patient instructed to call provider with temperature of 100.4 or greater or nausea/vomiting/ diarrhea or pain not controlled by medications  Discharged ambulatory.

## 2022-10-04 ENCOUNTER — OFFICE VISIT (OUTPATIENT)
Dept: INTERNAL MEDICINE CLINIC | Facility: CLINIC | Age: 82
End: 2022-10-04
Payer: MEDICARE

## 2022-10-04 VITALS
HEART RATE: 79 BPM | DIASTOLIC BLOOD PRESSURE: 66 MMHG | SYSTOLIC BLOOD PRESSURE: 139 MMHG | HEIGHT: 64 IN | TEMPERATURE: 98.1 F | OXYGEN SATURATION: 96 % | WEIGHT: 152 LBS | BODY MASS INDEX: 25.95 KG/M2

## 2022-10-04 DIAGNOSIS — R41.3 MEMORY LOSS: Primary | ICD-10-CM

## 2022-10-04 DIAGNOSIS — E03.9 ACQUIRED HYPOTHYROIDISM: ICD-10-CM

## 2022-10-04 LAB
TSH, 3RD GENERATION: 0.05 UIU/ML (ref 0.36–3.74)
VIT B12 SERPL-MCNC: 221 PG/ML (ref 193–986)

## 2022-10-04 PROCEDURE — G8484 FLU IMMUNIZE NO ADMIN: HCPCS | Performed by: INTERNAL MEDICINE

## 2022-10-04 PROCEDURE — G8427 DOCREV CUR MEDS BY ELIG CLIN: HCPCS | Performed by: INTERNAL MEDICINE

## 2022-10-04 PROCEDURE — 99214 OFFICE O/P EST MOD 30 MIN: CPT | Performed by: INTERNAL MEDICINE

## 2022-10-04 PROCEDURE — 1090F PRES/ABSN URINE INCON ASSESS: CPT | Performed by: INTERNAL MEDICINE

## 2022-10-04 PROCEDURE — G8399 PT W/DXA RESULTS DOCUMENT: HCPCS | Performed by: INTERNAL MEDICINE

## 2022-10-04 PROCEDURE — 1036F TOBACCO NON-USER: CPT | Performed by: INTERNAL MEDICINE

## 2022-10-04 PROCEDURE — 1123F ACP DISCUSS/DSCN MKR DOCD: CPT | Performed by: INTERNAL MEDICINE

## 2022-10-04 PROCEDURE — G8417 CALC BMI ABV UP PARAM F/U: HCPCS | Performed by: INTERNAL MEDICINE

## 2022-10-04 SDOH — ECONOMIC STABILITY: FOOD INSECURITY: WITHIN THE PAST 12 MONTHS, THE FOOD YOU BOUGHT JUST DIDN'T LAST AND YOU DIDN'T HAVE MONEY TO GET MORE.: NEVER TRUE

## 2022-10-04 SDOH — ECONOMIC STABILITY: FOOD INSECURITY: WITHIN THE PAST 12 MONTHS, YOU WORRIED THAT YOUR FOOD WOULD RUN OUT BEFORE YOU GOT MONEY TO BUY MORE.: NEVER TRUE

## 2022-10-04 ASSESSMENT — PATIENT HEALTH QUESTIONNAIRE - PHQ9
SUM OF ALL RESPONSES TO PHQ QUESTIONS 1-9: 0
1. LITTLE INTEREST OR PLEASURE IN DOING THINGS: 0
2. FEELING DOWN, DEPRESSED OR HOPELESS: 0
SUM OF ALL RESPONSES TO PHQ QUESTIONS 1-9: 0
SUM OF ALL RESPONSES TO PHQ9 QUESTIONS 1 & 2: 0
SUM OF ALL RESPONSES TO PHQ QUESTIONS 1-9: 0
SUM OF ALL RESPONSES TO PHQ QUESTIONS 1-9: 0

## 2022-10-04 ASSESSMENT — SOCIAL DETERMINANTS OF HEALTH (SDOH): HOW HARD IS IT FOR YOU TO PAY FOR THE VERY BASICS LIKE FOOD, HOUSING, MEDICAL CARE, AND HEATING?: NOT HARD AT ALL

## 2022-10-04 ASSESSMENT — ENCOUNTER SYMPTOMS: SHORTNESS OF BREATH: 0

## 2022-10-04 NOTE — PROGRESS NOTES
Simi Davis M.D. Internal Medicine  [address] [facility] [place] , [address]   Office : [phone]  Fax : [phone]    Chief Complaint   Patient presents with    Dementia     Possible Dementia wants to be evaulated       History of Present Illness:  Romelia Rojas is a 79 y.o. female. Memory Loss    Patient reports onset of memory loss was 1 to 6 months ago. Onset quality is gradual.     Symptoms associated with memory loss include changes in short-term memory and repetitive questions. Symptoms do not include changes in long-term memory, difficulty recalling words, day/night behavior changes or disorientation outside familiar environments. Behavorial problems for memory loss include agitation. Patient does not have the following behavorial problems associated with memory loss: paranoia, suspiciousness, hallucinations or delusions. Family and/or patient concerns for memory loss include driving and cooking. The family does not monitor medication usage. Lives with: lives alone. Patient lives in a/an own home. Additional narrative: Repeats herself frequently        Past Medical History:  Past Medical History:   Diagnosis Date    Gastroesophageal reflux disease     Hyperlipidemia     Hypothyroidism     Osteopenia     Vitamin D deficiency      Past Surgical History:  Past Surgical History:   Procedure Laterality Date    CATARACT REMOVAL      lens implants on 8/30 in the left eye    COLONOSCOPY  2006    HEMORRHOID SURGERY      HYSTERECTOMY (CERVIX STATUS UNKNOWN)      OTHER SURGICAL HISTORY      lap jabari fundoplication    OVARY REMOVAL       Allergies:    Allergies   Allergen Reactions    Bee Venom Swelling    Codeine Other (See Comments)     N/V    Sulfa Antibiotics Nausea Only    Penicillins Rash     Medications:   Current Outpatient Medications   Medication Sig Dispense Refill    ergocalciferol (ERGOCALCIFEROL) 1.25 MG (81429 UT) capsule Take 1 capsule by mouth once a week TAKE 1 CAPSULE BY MOUTH ONE TIME PER WEEK 12 capsule 1    estradiol (CLIMARA) 0.025 MG/24HR Place 1 patch onto the skin once a week 12 patch 1    levothyroxine (SYNTHROID) 75 MCG tablet Take 1 tablet by mouth every morning (before breakfast) TAKE 1 TABLET BY MOUTH EVERY DAY BEFORE BREAKFAST 90 tablet 1    zoledronic acid (RECLAST) 5 MG/100ML SOLN Infuse 5 mg intravenously once      mometasone (ELOCON) 0.1 % cream Apply topically 2 times daily      Bempedoic Acid (NEXLETOL) 180 MG TABS Take 180 mg by mouth daily (Patient not taking: Reported on 10/4/2022) 90 tablet 1    denosumab (PROLIA) 60 MG/ML SOSY SC injection Inject 60 mg into the skin (Patient not taking: No sig reported)       No current facility-administered medications for this visit. Social History:  Social History     Tobacco Use    Smoking status: Never    Smokeless tobacco: Never   Substance Use Topics    Alcohol use: No     Family History  Family History   Problem Relation Age of Onset    Lung Disease Brother     Heart Attack Brother     Heart Attack Sister     Hypertension Sister     Diabetes Sister     Thyroid Cancer Sister     Pacemaker Sister     Diabetes Brother     Thyroid Disease Sister         Takes on Levothyroxine    Diabetes Mother     Hypertension Mother     Stroke Father     Hypertension Father     Cancer Sister         Ovarian    Diabetes Brother        Review of Systems   Constitutional:  Negative for appetite change, fatigue and fever. Respiratory:  Negative for shortness of breath. Cardiovascular:  Negative for chest pain. Neurological:  Negative for speech difficulty and weakness. Psychiatric/Behavioral:  Positive for behavioral problems and memory loss. Negative for confusion.         Vital Signs  /66 (Site: Left Upper Arm, Position: Sitting, Cuff Size: Large Adult)   Pulse 79   Temp 98.1 °F (36.7 °C) (Temporal)   Ht 5' 4\" (1.626 m)   Wt 152 lb (68.9 kg)   SpO2 96%

## 2022-10-05 PROBLEM — E53.8 VITAMIN B12 DEFICIENCY: Status: ACTIVE | Noted: 2022-10-05

## 2022-10-05 RX ORDER — LEVOTHYROXINE SODIUM 0.05 MG/1
TABLET ORAL
Qty: 90 TABLET | Refills: 1 | Status: SHIPPED | OUTPATIENT
Start: 2022-10-05

## 2022-10-10 ENCOUNTER — TELEPHONE (OUTPATIENT)
Dept: INTERNAL MEDICINE CLINIC | Facility: CLINIC | Age: 82
End: 2022-10-10

## 2022-10-10 NOTE — TELEPHONE ENCOUNTER
----- Message from Valdokiki Delgadoer sent at 10/10/2022 11:05 AM EDT -----  Subject: Message to Provider    QUESTIONS  Information for Provider? patient has a CT Scan schedule 10/12/22 @ 3P   would like to got to [facility] to get the scan it's closer to   her verse [facility] location please contact her about this please   ---------------------------------------------------------------------------  --------------  [id number]  [phone]; OK to leave message on voicemail  ---------------------------------------------------------------------------  --------------  SCRIPT ANSWERS  Relationship to Patient?  Self

## 2022-10-10 NOTE — TELEPHONE ENCOUNTER
Pt called stating that she is wanting to know if she can have her imaging in [address] [place] [address]. Pt stated she spoke with someone earlier today but can't remember with whom she spoke with.  Ty

## 2022-10-12 ENCOUNTER — HOSPITAL ENCOUNTER (OUTPATIENT)
Dept: CT IMAGING | Age: 82
Discharge: HOME | End: 2022-10-15

## 2022-10-12 DIAGNOSIS — R41.3 MEMORY LOSS: ICD-10-CM

## 2022-10-13 ENCOUNTER — TELEPHONE (OUTPATIENT)
Dept: INTERNAL MEDICINE CLINIC | Facility: CLINIC | Age: 82
End: 2022-10-13

## 2022-10-13 NOTE — TELEPHONE ENCOUNTER
----- Message from Joey Majano sent at 10/13/2022  2:37 PM EDT -----  Subject: Message to Provider    QUESTIONS  Information for Provider? patient'sulma yoo is returning a call to   [place]. ..  Please call back at   ---------------------------------------------------------------------------  --------------  [id number]  [phone]; OK to leave message on voicemail  ---------------------------------------------------------------------------  --------------  SCRIPT ANSWERS  undefined

## 2022-10-13 NOTE — TELEPHONE ENCOUNTER
----- Message from Johnny Dodd sent at 10/13/2022  9:25 AM EDT -----  Subject: Message to Provider    QUESTIONS  Information for Provider? Mrs. Lou Ortiz called to say that her scan has   been done. She wanted to let the  know to watch out for the report. Thank you  ---------------------------------------------------------------------------  --------------  Avtar Mejia INFO  [phone]; OK to leave message on voicemail  ---------------------------------------------------------------------------  --------------  SCRIPT ANSWERS  Relationship to Patient?  Self

## 2022-10-31 ENCOUNTER — OFFICE VISIT (OUTPATIENT)
Dept: INTERNAL MEDICINE CLINIC | Facility: CLINIC | Age: 82
End: 2022-10-31
Payer: MEDICARE

## 2022-10-31 VITALS
BODY MASS INDEX: 25.27 KG/M2 | TEMPERATURE: 97.2 F | SYSTOLIC BLOOD PRESSURE: 156 MMHG | OXYGEN SATURATION: 96 % | DIASTOLIC BLOOD PRESSURE: 75 MMHG | WEIGHT: 148 LBS | HEIGHT: 64 IN | HEART RATE: 70 BPM

## 2022-10-31 DIAGNOSIS — E78.49 OTHER HYPERLIPIDEMIA: Primary | ICD-10-CM

## 2022-10-31 DIAGNOSIS — E55.9 VITAMIN D DEFICIENCY: ICD-10-CM

## 2022-10-31 DIAGNOSIS — E53.8 VITAMIN B12 DEFICIENCY: ICD-10-CM

## 2022-10-31 DIAGNOSIS — M85.80 OSTEOPENIA, UNSPECIFIED LOCATION: ICD-10-CM

## 2022-10-31 DIAGNOSIS — E03.9 ACQUIRED HYPOTHYROIDISM: ICD-10-CM

## 2022-10-31 DIAGNOSIS — R41.3 MEMORY LOSS: ICD-10-CM

## 2022-10-31 LAB
T4 FREE SERPL-MCNC: 1.3 NG/DL (ref 0.78–1.46)
TSH, 3RD GENERATION: 1 UIU/ML (ref 0.36–3.74)

## 2022-10-31 PROCEDURE — G8427 DOCREV CUR MEDS BY ELIG CLIN: HCPCS | Performed by: INTERNAL MEDICINE

## 2022-10-31 PROCEDURE — 99214 OFFICE O/P EST MOD 30 MIN: CPT | Performed by: INTERNAL MEDICINE

## 2022-10-31 PROCEDURE — 1090F PRES/ABSN URINE INCON ASSESS: CPT | Performed by: INTERNAL MEDICINE

## 2022-10-31 PROCEDURE — G8417 CALC BMI ABV UP PARAM F/U: HCPCS | Performed by: INTERNAL MEDICINE

## 2022-10-31 PROCEDURE — G8484 FLU IMMUNIZE NO ADMIN: HCPCS | Performed by: INTERNAL MEDICINE

## 2022-10-31 PROCEDURE — G8399 PT W/DXA RESULTS DOCUMENT: HCPCS | Performed by: INTERNAL MEDICINE

## 2022-10-31 PROCEDURE — 1123F ACP DISCUSS/DSCN MKR DOCD: CPT | Performed by: INTERNAL MEDICINE

## 2022-10-31 PROCEDURE — 1036F TOBACCO NON-USER: CPT | Performed by: INTERNAL MEDICINE

## 2022-10-31 RX ORDER — ZOLEDRONIC ACID 5 MG/100ML
5 INJECTION, SOLUTION INTRAVENOUS ONCE
Qty: 100 ML | Refills: 1 | Status: CANCELLED | OUTPATIENT
Start: 2022-10-31 | End: 2022-10-31

## 2022-10-31 ASSESSMENT — ENCOUNTER SYMPTOMS
CONSTIPATION: 0
SHORTNESS OF BREATH: 0

## 2022-10-31 NOTE — PROGRESS NOTES
Isreal Jimenez M.D. Internal Medicine  [address] , [address]   Office : [phone]  Fax : [phone]    Chief Complaint   Patient presents with    Hyperlipidemia     4 mo follow up    Discuss Medications     TSH low was changed to 50 mcg since then notice bags under eyes and hair thinning and hoarseness in voice       History of Present Illness:  Luis Obrien is a 79 y.o. female. HPI    Hyperlipidemia  Patient is in for follow-up for hyperlipidemia. Diet and Lifestyle: generally follows a low fat low cholesterol diet. Risk factors for vascular disease consist of hyperlipidemia. Last LDL was   Lab Results   Component Value Date    LDLCALC 173 (H) 10/21/2021   . Last ALT was   Lab Results   Component Value Date/Time    ALT 18 09/20/2022 02:30 PM   .  No muscle aches. She has not tried nexletol    Hypothyroidism  She presents for follow up of  Hypothyroidism and reports  no new problems. She reports her symptoms are  stable. The patient currently is taking thyroid replacement. Lab Results   Component Value Date    TSH 3.560 10/21/2021    IUL1TOW 0.054 (L) 10/04/2022   . Osteopenia/Vitamin D Deficiency  Last DEXA in 8/2021.   Currently on Reclast therapy and Vitamin D  Lab Results   Component Value Date/Time    VITD25 88.7 06/28/2022 02:14 PM        Lab Results   Component Value Date    CALCIUM 9.0 09/20/2022     Memory Loss/low B12  She has no started on OTC Vitamin B12 yet  Lab Results   Component Value Date    OROJLBRB60 221 10/04/2022         Past Medical History:  Past Medical History:   Diagnosis Date    Gastroesophageal reflux disease     Hyperlipidemia     Hypothyroidism     Osteopenia     Vitamin D deficiency      Past Surgical History:  Past Surgical History:   Procedure Laterality Date    CATARACT REMOVAL      lens implants on 8/30 in the left eye    COLONOSCOPY  2006    HEMORRHOID SURGERY      HYSTERECTOMY (CERVIX STATUS UNKNOWN)      OTHER SURGICAL HISTORY      lap jabari fundoplication    OVARY REMOVAL       Allergies: Allergies   Allergen Reactions    Bee Venom Swelling    Codeine Other (See Comments)     N/V    Sulfa Antibiotics Nausea Only    Penicillins Rash     Medications:   Current Outpatient Medications   Medication Sig Dispense Refill    levothyroxine (SYNTHROID) 50 MCG tablet TAKE 1 TABLET BY MOUTH EVERY DAY BEFORE BREAKFAST 90 tablet 1    ergocalciferol (ERGOCALCIFEROL) 1.25 MG (00032 UT) capsule Take 1 capsule by mouth once a week TAKE 1 CAPSULE BY MOUTH ONE TIME PER WEEK 12 capsule 1    estradiol (CLIMARA) 0.025 MG/24HR Place 1 patch onto the skin once a week 12 patch 1    zoledronic acid (RECLAST) 5 MG/100ML SOLN Infuse 5 mg intravenously once      mometasone (ELOCON) 0.1 % cream Apply topically 2 times daily      Bempedoic Acid (NEXLETOL) 180 MG TABS Take 180 mg by mouth daily (Patient not taking: No sig reported) 90 tablet 1     No current facility-administered medications for this visit. Social History:  Social History     Tobacco Use    Smoking status: Never    Smokeless tobacco: Never   Substance Use Topics    Alcohol use: No     Family History  Family History   Problem Relation Age of Onset    Lung Disease Brother     Heart Attack Brother     Heart Attack Sister     Hypertension Sister     Diabetes Sister     Thyroid Cancer Sister     Pacemaker Sister     Diabetes Brother     Thyroid Disease Sister         Takes on Levothyroxine    Diabetes Mother     Hypertension Mother     Stroke Father     Hypertension Father     Cancer Sister         Ovarian    Diabetes Brother        Review of Systems   Constitutional:  Negative for chills, fatigue and fever. Respiratory:  Negative for shortness of breath. Cardiovascular:  Negative for chest pain and leg swelling. Gastrointestinal:  Negative for constipation.    Musculoskeletal:  Negative for gait problem. Neurological:  Negative for speech difficulty. Psychiatric/Behavioral:  Negative for dysphoric mood. Vital Signs  BP (!) 156/75 (Site: Left Upper Arm, Position: Sitting, Cuff Size: Large Adult)   Pulse 70   Temp 97.2 °F (36.2 °C) (Temporal)   Ht 5' 4\" (1.626 m)   Wt 148 lb (67.1 kg)   SpO2 96%   BMI 25.40 kg/m²   Body mass index is 25.4 kg/m². Physical Exam  Vitals reviewed. Constitutional:       General: She is not in acute distress. Appearance: Normal appearance. She is not ill-appearing. HENT:      Head: Normocephalic and atraumatic. Eyes:      General: No scleral icterus. Conjunctiva/sclera: Conjunctivae normal.   Neck:      Vascular: No carotid bruit. Cardiovascular:      Rate and Rhythm: Normal rate and regular rhythm. Heart sounds: Normal heart sounds. No murmur heard. Pulmonary:      Effort: Pulmonary effort is normal.      Breath sounds: Normal breath sounds. Musculoskeletal:         General: No swelling. Lymphadenopathy:      Cervical: No cervical adenopathy. Skin:     Coloration: Skin is not jaundiced. Findings: No rash. Neurological:      General: No focal deficit present. Mental Status: She is alert. Mental status is at baseline. Cranial Nerves: No cranial nerve deficit. Motor: No weakness. Gait: Gait normal.   Psychiatric:         Mood and Affect: Mood normal.         Behavior: Behavior normal.         Thought Content: Thought content normal.         Judgment: Judgment normal.         Assessment/Plan:  Meredith Coleman was seen today for hyperlipidemia and discuss medications. Diagnoses and all orders for this visit:    Other hyperlipidemia    Acquired hypothyroidism  -     T4, Free; Future  -     T4, Free    Memory loss    Vitamin D deficiency    Osteopenia, unspecified location    Vitamin B12 deficiency  -     TSH;  Future  -     TSH  Start B12, try nexletol and continue all other medications/vitamins    Return in about 4 months (around 2/28/2023), or if symptoms worsen or fail to improve.   __  John Fortune M.D.

## 2022-11-29 DIAGNOSIS — E55.9 VITAMIN D DEFICIENCY: ICD-10-CM

## 2022-11-29 RX ORDER — ERGOCALCIFEROL 1.25 MG/1
CAPSULE ORAL
Qty: 12 CAPSULE | Refills: 1 | Status: SHIPPED | OUTPATIENT
Start: 2022-11-29

## 2023-01-28 ENCOUNTER — PATIENT MESSAGE (OUTPATIENT)
Dept: INTERNAL MEDICINE CLINIC | Facility: CLINIC | Age: 83
End: 2023-01-28

## 2023-01-28 DIAGNOSIS — R41.3 MEMORY LOSS: Primary | ICD-10-CM

## 2023-01-30 NOTE — TELEPHONE ENCOUNTER
[facility] 2023 10:58 AM EST      ----- Message -----  From: Leigh Michaels  Sent: 2023 3:38 PM EST  To: [facility] Clinical Staff  Subject: Referral to the [facility] [place] in [address] [place] [address]    Dr. Kate Velázquez,   This is Paola Hopkins, Cecilia Monica daughter,  1940. I would like for you to make a referral for my Mother to the [address] [place] [address] in [place]. Since she saw you last in October, shes had more issues with dementia and its getting worse. I feel she needs a more extensive evaluation to determine her memory condition.  I await your response and referral. Thank you,   Paola Hopkins [phone]

## 2023-04-02 DIAGNOSIS — E03.9 ACQUIRED HYPOTHYROIDISM: ICD-10-CM

## 2023-04-04 RX ORDER — LEVOTHYROXINE SODIUM 0.05 MG/1
TABLET ORAL
Qty: 90 TABLET | Refills: 1 | OUTPATIENT
Start: 2023-04-04

## 2023-05-16 DIAGNOSIS — N95.1 MENOPAUSAL SYMPTOMS: ICD-10-CM

## 2023-05-16 NOTE — TELEPHONE ENCOUNTER
Estradiol patches were sent in 6/28/22 for 12 patches and 1 refill.      Next appointment: Patient has upcoming appointment in august with a different provider

## 2023-08-15 DIAGNOSIS — N95.1 MENOPAUSAL SYMPTOMS: ICD-10-CM

## 2024-12-04 ENCOUNTER — APPOINTMENT (OUTPATIENT)
Dept: URBAN - NONMETROPOLITAN AREA CLINIC 1 | Facility: CLINIC | Age: 84
Setting detail: DERMATOLOGY
End: 2024-12-04

## 2024-12-04 DIAGNOSIS — L82.1 OTHER SEBORRHEIC KERATOSIS: ICD-10-CM | Status: STABLE

## 2024-12-04 DIAGNOSIS — D22 MELANOCYTIC NEVI: ICD-10-CM | Status: STABLE

## 2024-12-04 DIAGNOSIS — L57.8 OTHER SKIN CHANGES DUE TO CHRONIC EXPOSURE TO NONIONIZING RADIATION: ICD-10-CM | Status: STABLE

## 2024-12-04 DIAGNOSIS — L81.4 OTHER MELANIN HYPERPIGMENTATION: ICD-10-CM | Status: STABLE

## 2024-12-04 DIAGNOSIS — L57.0 ACTINIC KERATOSIS: ICD-10-CM

## 2024-12-04 DIAGNOSIS — D18.0 HEMANGIOMA: ICD-10-CM | Status: STABLE

## 2024-12-04 PROBLEM — D18.01 HEMANGIOMA OF SKIN AND SUBCUTANEOUS TISSUE: Status: ACTIVE | Noted: 2024-12-04

## 2024-12-04 PROBLEM — D48.5 NEOPLASM OF UNCERTAIN BEHAVIOR OF SKIN: Status: ACTIVE | Noted: 2024-12-04

## 2024-12-04 PROBLEM — D22.5 MELANOCYTIC NEVI OF TRUNK: Status: ACTIVE | Noted: 2024-12-04

## 2024-12-04 PROCEDURE — ? BIOPSY BY SHAVE METHOD

## 2024-12-04 PROCEDURE — ? FULL BODY SKIN EXAM

## 2024-12-04 PROCEDURE — ? SUNSCREEN RECOMMENDATIONS

## 2024-12-04 PROCEDURE — 17000 DESTRUCT PREMALG LESION: CPT

## 2024-12-04 PROCEDURE — 69100 BIOPSY OF EXTERNAL EAR: CPT | Mod: 59

## 2024-12-04 PROCEDURE — ? PHOTO-DOCUMENTATION

## 2024-12-04 PROCEDURE — 99203 OFFICE O/P NEW LOW 30 MIN: CPT | Mod: 25

## 2024-12-04 PROCEDURE — ? COUNSELING

## 2024-12-04 PROCEDURE — ? TREATMENT REGIMEN

## 2024-12-04 PROCEDURE — ? LIQUID NITROGEN

## 2024-12-04 ASSESSMENT — LOCATION DETAILED DESCRIPTION DERM
LOCATION DETAILED: RIGHT MEDIAL SUPERIOR CHEST
LOCATION DETAILED: RIGHT SUPERIOR HELIX
LOCATION DETAILED: EPIGASTRIC SKIN
LOCATION DETAILED: LEFT MEDIAL UPPER BACK
LOCATION DETAILED: LEFT INFERIOR UPPER BACK
LOCATION DETAILED: SUPERIOR THORACIC SPINE
LOCATION DETAILED: LEFT INFERIOR HELIX
LOCATION DETAILED: RIGHT SUPERIOR OCCIPITAL SCALP

## 2024-12-04 ASSESSMENT — LOCATION SIMPLE DESCRIPTION DERM
LOCATION SIMPLE: UPPER BACK
LOCATION SIMPLE: ABDOMEN
LOCATION SIMPLE: LEFT EAR
LOCATION SIMPLE: RIGHT OCCIPITAL SCALP
LOCATION SIMPLE: CHEST
LOCATION SIMPLE: RIGHT EAR
LOCATION SIMPLE: LEFT UPPER BACK

## 2024-12-04 ASSESSMENT — LOCATION ZONE DERM
LOCATION ZONE: TRUNK
LOCATION ZONE: SCALP
LOCATION ZONE: EAR

## 2024-12-04 NOTE — PROCEDURE: BIOPSY BY SHAVE METHOD
Detail Level: Detailed
Depth Of Biopsy: dermis
Was A Bandage Applied: Yes
Size Of Lesion In Cm: 3
X Size Of Lesion In Cm: 1.5
Biopsy Type: H and E
Biopsy Method: Personna blade
Anesthesia Type: 1% lidocaine with epinephrine and a 1:10 solution of 8.4% sodium bicarbonate
Anesthesia Volume In Cc: 0.5
Additional Anesthesia Volume In Cc (Will Not Render If 0): 0
Hemostasis: Aluminum Chloride
Wound Care: Petrolatum
Dressing: bandage
Destruction After The Procedure: No
Type Of Destruction Used: Curettage
Curettage Text: The wound bed was treated with curettage after the biopsy was performed.
Cryotherapy Text: The wound bed was treated with cryotherapy after the biopsy was performed.
Electrodesiccation Text: The wound bed was treated with electrodesiccation after the biopsy was performed.
Electrodesiccation And Curettage Text: The wound bed was treated with electrodesiccation and curettage after the biopsy was performed.
Silver Nitrate Text: The wound bed was treated with silver nitrate after the biopsy was performed.
Lab: -97
Consent: Written consent was obtained and risk was reviewed  per signed  consent form. Biopsy was performed with patient verbal permission.
Post-care instructions were given and  reviewed with the patient in detail. Patient is to keep the biopsy site dry overnight, and then apply healing ointment daily until healed. Patient is instructed to call for any questions or problems.
Notification Instructions: Patient will be notified of biopsy results. However, patient instructed to call the office if not contacted within 2 weeks.
Billing Type: Third-Party Bill
Information: Selecting Yes will display possible errors in your note based on the variables you have selected. This validation is only offered as a suggestion for you. PLEASE NOTE THAT THE VALIDATION TEXT WILL BE REMOVED WHEN YOU FINALIZE YOUR NOTE. IF YOU WANT TO FAX A PRELIMINARY NOTE YOU WILL NEED TO TOGGLE THIS TO 'NO' IF YOU DO NOT WANT IT IN YOUR FAXED NOTE.